# Patient Record
Sex: FEMALE | Race: WHITE
[De-identification: names, ages, dates, MRNs, and addresses within clinical notes are randomized per-mention and may not be internally consistent; named-entity substitution may affect disease eponyms.]

---

## 2017-03-29 ENCOUNTER — HOSPITAL ENCOUNTER (EMERGENCY)
Dept: HOSPITAL 25 - UCEAST | Age: 42
Discharge: HOME | End: 2017-03-29
Payer: COMMERCIAL

## 2017-03-29 VITALS — DIASTOLIC BLOOD PRESSURE: 74 MMHG | SYSTOLIC BLOOD PRESSURE: 110 MMHG

## 2017-03-29 DIAGNOSIS — J02.0: Primary | ICD-10-CM

## 2017-03-29 DIAGNOSIS — Z32.02: ICD-10-CM

## 2017-03-29 PROCEDURE — 87651 STREP A DNA AMP PROBE: CPT

## 2017-03-29 PROCEDURE — 99212 OFFICE O/P EST SF 10 MIN: CPT

## 2017-03-29 PROCEDURE — G0463 HOSPITAL OUTPT CLINIC VISIT: HCPCS

## 2017-03-29 PROCEDURE — 84702 CHORIONIC GONADOTROPIN TEST: CPT

## 2017-03-29 NOTE — UC
Throat Pain/Nasal Edward HPI





- HPI Summary


HPI Summary: 





complaint of sore throat that started yesterday


 pain has been increasing


 fever started today


denies nasal congestion and cough


feels like her body is aching


 daughter has strep throat


took some ibuprofen at 1900 with some relief





3 weeks late for menses


 LMP 2/13/17


took home pregnancy tests that were negative last week


doesn't feel pregnant- usually gets morning sickness, denies breast tenderness 

or fatigue





- History of Current Complaint


Chief Complaint: UCRespiratory


Stated Complaint: THROAT PAIN


Time Seen by Provider: 03/29/17 20:28


Hx Obtained From: Patient


Hx Last Menstrual Period: 3 weeks late





- Allergies/Home Medications


Allergies/Adverse Reactions: 


 Allergies











Allergy/AdvReac Type Severity Reaction Status Date / Time


 


No Known Allergies Allergy   Verified 03/29/17 20:28














PMH/Surg Hx/FS Hx/Imm Hx


Previously Healthy: Yes





- Surgical History


Surgical History: Yes


Surgery Procedure, Year, and Place: sinus





- Family History


Known Family History: 


   Negative: Cardiac Disease, Hypertension, Diabetes





- Social History


Occupation: Employed Full-time


Lives: With Family


Alcohol Use: None


Substance Use Type: None


Smoking Status (MU): Never Smoked Tobacco





Review of Systems


Constitutional: Fever


Skin: Negative


Eyes: Negative


ENT: Sore Throat


Respiratory: Negative


Cardiovascular: Negative


Gastrointestinal: Negative


Genitourinary: Negative


Motor: Negative


Neurovascular: Negative


Musculoskeletal: Negative


Neurological: Negative


Psychological: Negative


All Other Systems Reviewed And Are Negative: Yes





Physical Exam


Triage Information Reviewed: Yes


Appearance: No Pain Distress, Well-Nourished


Vital Signs: 


 Initial Vital Signs











Temp  99.8 F   03/29/17 20:22


 


Pulse  104   03/29/17 20:22


 


Resp  16   03/29/17 20:22


 


BP  110/74   03/29/17 20:22


 


Pulse Ox  99   03/29/17 20:22











Vital Signs Reviewed: Yes


Eyes: Positive: Conjunctiva Clear


ENT: Positive: Pharyngeal erythema, TMs normal.  Negative: Nasal congestion, 

Nasal drainage


Dental: Positive: Cervical Lymphadenopathy


Respiratory: Positive: Lungs clear, Normal breath sounds, No respiratory 

distress


Cardiovascular: Positive: RRR, No Murmur, Pulses Normal


Abdomen Description: Positive: Nontender, Soft


Bowel Sounds: Positive: Present


Musculoskeletal: Positive: No Edema


Neurological: Positive: Alert


Psychological Exam: Normal


Skin Exam: Normal





Throat Pain/Nasal Course/Dx





- Differential Dx/Diagnosis


Differential Diagnosis/HQI/PQRI: Pharyngitis, Tonsillitis, Other - strep


Provider Diagnoses: strep pharyngitis





Discharge





- Discharge Plan


Condition: Stable


Disposition: HOME


Prescriptions: 


Penicillin VK TAB* [Penicillin  mg Tab*] 500 mg PO TID #30 tab


Patient Education Materials:  Strep Throat (ED)


Referrals: 


No Primary Care Phys,NOPCP [Primary Care Provider] - 


[facility] [place]IAN REFERRAL [Outside]


Additional Instructions: 


Start antibiotic as directed 


 Increase fluids and rest


 Take acetaminophen or ibuprofen for fever or pain


Please review your discharge instructions. 


If your symptoms do not improve please call your primary care provider or 

return to urgent care

## 2017-04-17 ENCOUNTER — HOSPITAL ENCOUNTER (EMERGENCY)
Dept: HOSPITAL 25 - UCEAST | Age: 42
Discharge: HOME | End: 2017-04-17
Payer: COMMERCIAL

## 2017-04-17 VITALS — SYSTOLIC BLOOD PRESSURE: 111 MMHG | DIASTOLIC BLOOD PRESSURE: 75 MMHG

## 2017-04-17 DIAGNOSIS — J02.0: Primary | ICD-10-CM

## 2017-04-17 PROCEDURE — 99212 OFFICE O/P EST SF 10 MIN: CPT

## 2017-04-17 PROCEDURE — G0463 HOSPITAL OUTPT CLINIC VISIT: HCPCS

## 2017-04-17 PROCEDURE — 87651 STREP A DNA AMP PROBE: CPT

## 2017-04-17 NOTE — UC
Throat Pain/Nasal Edward HPI





- HPI Summary


HPI Summary: 





ST since yesterday, HA for the last week or so. Pt and 3 kids all tx for strep 

2.5 weeks ago, all got better, now all sick again and 3 kids dx with strep 

again over the weekend. Denies fever or trouble breathing.





- History of Current Complaint


Chief Complaint: UCGeneralIllness


Stated Complaint: SORE THROAT


Time Seen by Provider: 04/17/17 20:22


Hx Obtained From: Patient


Hx Last Menstrual Period: 04/03/2017


Onset/Duration: Gradual Onset, Lasting Hours


Severity: Moderate


Cough: None


Associated Signs & Symptoms: Negative: Fever, Vomiting, Rash





- Allergies/Home Medications


Allergies/Adverse Reactions: 


 Allergies











Allergy/AdvReac Type Severity Reaction Status Date / Time


 


No Known Allergies Allergy   Verified 04/17/17 20:14











Home Medications: 


 Home Medications





Multi Vitamin with Iron 1 tab PO DAILY 04/17/17 [History Confirmed 04/17/17]











PMH/Surg Hx/FS Hx/Imm Hx


Endocrine History Of: 


   Denies: Diabetes, Thyroid Disease


Cardiovascular History Of: 


   Denies: Cardiac Disorders, Hypertension


Respiratory History Of: 


   Denies: COPD, Asthma


GI/ History Of: 


   Denies: Ulcer





- Surgical History


Surgical History: Yes


Surgery Procedure, Year, and Place: sinus





- Family History


Known Family History: 


   Negative: Cardiac Disease, Hypertension, Diabetes





- Social History


Alcohol Use: None


Substance Use Type: None


Smoking Status (MU): Never Smoked Tobacco





- Immunization History


Most Recent Influenza Vaccination: years ago


Most Recent Tetanus Shot: unknown





Review of Systems


Constitutional: Negative


Skin: Negative


Eyes: Negative


ENT: Sore Throat


Respiratory: Negative


Cardiovascular: Negative


Gastrointestinal: Negative


Genitourinary: Negative


Motor: Negative


Neurovascular: Negative


Musculoskeletal: Negative


Neurological: Negative


Psychological: Negative


All Other Systems Reviewed And Are Negative: Yes





Physical Exam


Triage Information Reviewed: Yes


Appearance: Well-Appearing, No Pain Distress, Well-Nourished


Vital Signs: 


 Initial Vital Signs











Temp  99.0 F   04/17/17 20:15


 


Pulse  86   04/17/17 20:15


 


Resp  17   04/17/17 20:15


 


BP  111/75   04/17/17 20:15


 


Pulse Ox  100   04/17/17 20:15











Vital Signs Reviewed: Yes


Eye Exam: Normal


Eyes: Positive: Conjunctiva Clear


ENT: Positive: Hearing grossly normal, Pharyngeal erythema - mild, TMs normal, 

Tonsillar swelling


Dental Exam: Normal


Neck exam: Normal


Neck: Positive: Supple, Nontender


Respiratory Exam: Normal


Respiratory: Positive: Chest non-tender, Lungs clear, Normal breath sounds, No 

respiratory distress, No accessory muscle use


Cardiovascular Exam: Normal


Cardiovascular: Positive: RRR, No Murmur


Musculoskeletal Exam: Normal


Neurological Exam: Normal


Neurological: Positive: Alert


Psychological Exam: Normal


Skin Exam: Normal





Throat Pain/Nasal Course/Dx





- Differential Dx/Diagnosis


Provider Diagnoses: strep throat





Discharge





- Discharge Plan


Condition: Stable


Disposition: HOME


Prescriptions: 


Amoxicillin (*) [Amoxicillin 875 MG (*)] 875 mg PO BID #19 tab


Patient Education Materials:  Strep Throat (ED)


Referrals: 


No Primary Care Phys,NOPCP [Primary Care Provider] -

## 2017-05-02 ENCOUNTER — HOSPITAL ENCOUNTER (EMERGENCY)
Dept: HOSPITAL 25 - UCEAST | Age: 42
Discharge: HOME | End: 2017-05-02
Payer: COMMERCIAL

## 2017-05-02 VITALS — SYSTOLIC BLOOD PRESSURE: 126 MMHG | DIASTOLIC BLOOD PRESSURE: 69 MMHG

## 2017-05-02 DIAGNOSIS — J02.9: Primary | ICD-10-CM

## 2017-05-02 PROCEDURE — G0463 HOSPITAL OUTPT CLINIC VISIT: HCPCS

## 2017-05-02 PROCEDURE — 87651 STREP A DNA AMP PROBE: CPT

## 2017-05-02 PROCEDURE — 99212 OFFICE O/P EST SF 10 MIN: CPT

## 2017-05-02 NOTE — UC
Throat Pain/Nasal Edward HPI





- HPI Summary


HPI Summary: 





Pt presents with c/o sore throat.  Pt states that her children have been 

"passing strep throat throughout the house" Pt was diagnosed with strep thorat 

1 months ago.  Pt's son was diagnosed today with strep today.  





- History of Current Complaint


Chief Complaint: UCRespiratory


Stated Complaint: SORE THROAT


Time Seen by Provider: 05/02/17 21:21


Hx Obtained From: Patient


Hx Last Menstrual Period: 04/03/2017


Pregnant?: No


Onset/Duration: Sudden Onset, Lasting Hours


Severity: Mild


Cough: None


Associated Signs & Symptoms: Positive: Dysphagia





- Epiglottits Risk Factors


Epiglottis Risk Factors: Sudden Onset





- Allergies/Home Medications


Allergies/Adverse Reactions: 


 Allergies











Allergy/AdvReac Type Severity Reaction Status Date / Time


 


No Known Allergies Allergy   Verified 04/17/17 20:14














PMH/Surg Hx/FS Hx/Imm Hx


Previously Healthy: Yes


Endocrine History Of: 


   Denies: Diabetes, Thyroid Disease


Cardiovascular History Of: 


   Denies: Cardiac Disorders, Hypertension


Respiratory History Of: 


   Denies: COPD, Asthma


GI/ History Of: 


   Denies: Ulcer





- Surgical History


Surgical History: Yes


Surgery Procedure, Year, and Place: sinus





- Family History


Known Family History: 


   Negative: Cardiac Disease, Hypertension, Diabetes





- Social History


Lives: With Family


Alcohol Use: None


Substance Use Type: None


Smoking Status (MU): Never Smoked Tobacco





- Immunization History


Most Recent Influenza Vaccination: years ago


Most Recent Tetanus Shot: unknown





Review of Systems


Constitutional: Negative


Skin: Negative


Eyes: Negative


ENT: Sore Throat


Respiratory: Negative


Cardiovascular: Negative


Gastrointestinal: Negative


Genitourinary: Negative


Motor: Negative


Neurovascular: Negative


Musculoskeletal: Myalgia


Neurological: Headache


Psychological: Negative


All Other Systems Reviewed And Are Negative: Yes





Physical Exam


Triage Information Reviewed: Yes


Appearance: Well-Appearing


Vital Signs: 


 Initial Vital Signs











Temp  98.5 F   05/02/17 21:15


 


Pulse  87   05/02/17 21:15


 


Resp  18   05/02/17 21:15


 


BP  126/69   05/02/17 21:15


 


Pulse Ox  100   05/02/17 21:15











Eye Exam: Normal


ENT Exam: Other


ENT: Positive: Pharyngeal erythema


Neck: Positive: Enlarged Nodes @ - bialtaeral submandibualr


Respiratory Exam: Normal


Cardiovascular Exam: Normal


Musculoskeletal Exam: Normal


Neurological Exam: Normal


Psychological Exam: Normal


Skin Exam: Normal





Throat Pain/Nasal Course/Dx





- Differential Dx/Diagnosis


Differential Diagnosis/HQI/PQRI: Pharyngitis, Tonsillitis


Provider Diagnoses: Pharyngitis





Discharge





- Discharge Plan


Condition: Stable


Disposition: HOME


Prescriptions: 


Penicillin VK  MG(NF) [Penicillin  mg Tab(NF)] 500 mg PO Q8H #21 

tab


Patient Education Materials:  Pharyngitis (ED)


Referrals: 


[facility] [place] PHYSICIAN REFERRAL [Outside]


No Primary Care Phys,NOPCP [Primary Care Provider] - If Needed


Additional Instructions: 


Please follow up with your PCP or return to clinic as needed.

## 2019-08-22 NOTE — HP
CC:  Dr. Ananda Coronado *

 

ADMITTING HISTORY AND PHYSICAL:

 

DATE OF ADMISSION:  08/30/19 - \[facility]\""

 

ADMITTING DIAGNOSIS:  Bladder lesion.

 

PLANNED PROCEDURE:  Cystoscopy, excision biopsy of bladder lesion.

 

SURGEON:  Dr. Mota.

 

HISTORY OF PRESENT ILLNESS:  Kathryn Sims is a 44-year-old lady who for the 
last 10 days has had increased urinary frequency, urgency, and discomfort 
associated with the full bladder.  She was evaluated by Dr. Coronado who 
originally on examination noted what appeared to be a mass in the area of the 
dome of the bladder.  It was subsequently confirmed by him on a pelvic 
sonogram.  Cystoscopy done in my office revealed 2 to 3 cm _____ near the dome 
of the bladder with the mass effect but completely normal overlying mucosa.  My 
initial thought was that this may be related to a urachal remnant and a CT scan 
was obtained which showed some irregularity within this mass raising the 
concern for possible neoplasm and she is now being brought in for excision 
biopsies of the same.

 

PAST MEDICAL HISTORY:  Unremarkable.

 

PAST SURGICAL HISTORY:  Significant for surgery for deviated nasal septum.

 

MEDICATIONS ON ADMISSION:  None.

 

ALLERGIES:  No known drug allergies.

 

FAMILY HISTORY:  Negative for transitional cell carcinoma.

 

SOCIAL HISTORY:  Smoking history:  She is a nonsmoker.

 

REVIEW OF SYSTEMS:  She is otherwise in excellent health.  There is no history 
of diabetes mellitus or any other major systemic illness.

 

                               PHYSICAL EXAMINATION

 

GENERAL:  Reveals a pleasant, healthy-appearing young lady.

 

VITAL SIGNS:  Blood pressure is 120/70, pulse 93 per minute and regular, oxygen 
saturation 97% on room air, temperature 97.

 

LUNGS:  Clear bilaterally.

 

CARDIOVASCULAR:  Regular rate and rhythm.  S1, S2.

 

ABDOMEN:  Soft with no flank tenderness.

 

 IMPRESSION:  A 44-year-old lady with fairly recent onset of irritative voiding 
symptoms and mass in the area of the dome of the bladder with normal overlying 
mucosa.  Plan is for cystoscopy, excision biopsy.  I have discussed the 
procedure and possible risks including bleeding, infection, perforation of the 
bladder, and also explained likely the need for Landin catheterization for at 
least 1 to 3 days postoperatively.

 

PLAN:  Cystoscopy, excision biopsies of bladder lesion.

 

[id number]/[facility] #: [id number]

MTDD

## 2019-08-30 ENCOUNTER — HOSPITAL ENCOUNTER (OUTPATIENT)
Dept: HOSPITAL 25 - OR | Age: 44
Discharge: HOME | End: 2019-08-30
Attending: UROLOGY
Payer: COMMERCIAL

## 2019-08-30 VITALS — DIASTOLIC BLOOD PRESSURE: 71 MMHG | SYSTOLIC BLOOD PRESSURE: 111 MMHG

## 2019-08-30 DIAGNOSIS — K21.9: ICD-10-CM

## 2019-08-30 DIAGNOSIS — N32.89: Primary | ICD-10-CM

## 2019-08-30 PROCEDURE — 88305 TISSUE EXAM BY PATHOLOGIST: CPT

## 2019-08-30 PROCEDURE — 81025 URINE PREGNANCY TEST: CPT

## 2019-08-30 NOTE — OP
OPERATIVE REPORT:

 

DATE OF OPERATION:  08/30/19

 

DATE OF BIRTH:  [birth year]

 

SURGEON:  Eladio Mota MD.

 

ANESTHESIOLOGIST:  Dr. Walker.

 

ANESTHESIA:  General.

 

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

 

OPERATIVE PROCEDURE:  Cystoscopy, excision biopsy and fulguration of bladder lesion.

 

COMPLICATIONS:  None.

 

BLOOD LOSS:  Minimal.

 

INDICATIONS:  Kathryn Sims is a 44-year-old lady who had onset of irritated voiding symptoms in the f
orm of frequency and bladder pressure.  She was evaluated by Dr. Coronado and was noted to have a blad
miki lesion, which was confirmed on office cystoscopy.  Since the mucosa over the lesion appears eric
h, my feeling is that this may represent a urachal remnant, possibly with some inflammatory response.
 She is now being brought in for excision biopsy and fulguration.

 

OPERATIVE FINDINGS:  A 3 to 4 cm area near the dome of the bladder with protrusion, but normal overly
ing mucosa.

 

DESCRIPTION OF PROCEDURE:  After induction of general anesthesia, the patient was placed in dorsal li
thotomy position.  Sequential compression devices were in place and functioning.  Initial cystoscopy 
revealed normally located right and left ureteral orifices with clear efflux noted.  In the dome of t
he bladder, there was an approximately 3 to 4 cm area of significant protrusion with normal overlying
 mucosa.  The remainder of the bladder was unremarkable.  Using biopsy forceps, representative biopsi
es were obtained and sent for histopathology.  The lesion did not have the appearance of a submucosal
 cyst, which was another possibility in the differential diagnosis, and after representative biopsies
 were obtained, the area was carefully cauterized initially using the Bugbee and later on using the l
oop of the resectoscope.  At the end of the procedure, hemostasis appeared satisfactory and there was
 no evidence of bladder perforation.  A Landin catheter, size 20- [place], was placed without difficult
y and connected to a drainage bag.  The patient tolerated the procedure satisfactorily and was transf
erred back to the recovery area in stable condition.

 

 [id number]/[facility] #: [id number]